# Patient Record
(demographics unavailable — no encounter records)

---

## 2025-05-19 NOTE — DISCUSSION/SUMMARY
[de-identified] : Discussed the nature of the diagnosis and risk and benefits of different modalities of treatment. RT middle TF RT dupytrens  LT forearm soft tissue mass Discussed no intervention without loss of extension for Dupuytren's  Pt was reassured   Discussed conservative management as symptoms demand vs CSI. Pt would like a CSI. Done today and tolerated well Should CSI fail to provide relief, pt will take Tylenol and apply warm compresses. Pt does not take NSAID's due to stomach irritation  All questions answered.

## 2025-05-19 NOTE — PHYSICAL EXAM
[Left] : left elbow [Right] : right hand [FreeTextEntry3] : Pre tendinous cords to 3,4, & 5. No loss of extension

## 2025-05-19 NOTE — HISTORY OF PRESENT ILLNESS
[Constant] : constant [Household chores] : household chores [Leisure] : leisure [Sleep] : sleep [Nothing helps with pain getting better] : Nothing helps with pain getting better [Bending forward] : bending forward [Retired] : Work status: retired [de-identified] : 77 year old male followed for RT middle triggering & pain, worse at night. Has had 1 CSI. He c/o sofft tissue mass in the LT forearm     RT middle TF CSI: 10/14/24 [] : no [FreeTextEntry1] : right hand and left forearm [FreeTextEntry5] : Pt was seen in [place] and was diagnosed with Dupuytren in the rt hand.  Pt has trigger finger, middle right.  Left forearm patient states he has a small nodule. Pt states wearing a hand brace in the evening on the right hand, with relief. Pain with trigger finger in the evening 7/10

## 2025-05-19 NOTE — HISTORY OF PRESENT ILLNESS
[Constant] : constant [Household chores] : household chores [Leisure] : leisure [Sleep] : sleep [Nothing helps with pain getting better] : Nothing helps with pain getting better [Bending forward] : bending forward [Retired] : Work status: retired [de-identified] : 77 year old male followed for RT middle triggering & pain, worse at night. Has had 1 CSI. He c/o sofft tissue mass in the LT forearm     RT middle TF CSI: 10/14/24 [] : no [FreeTextEntry1] : right hand and left forearm [FreeTextEntry5] : Pt was seen in [place] and was diagnosed with Dupuytren in the rt hand.  Pt has trigger finger, middle right.  Left forearm patient states he has a small nodule. Pt states wearing a hand brace in the evening on the right hand, with relief. Pain with trigger finger in the evening 7/10

## 2025-05-19 NOTE — DISCUSSION/SUMMARY
[de-identified] : Discussed the nature of the diagnosis and risk and benefits of different modalities of treatment. RT middle TF RT dupytrens  LT forearm soft tissue mass Discussed no intervention without loss of extension for Dupuytren's  Pt was reassured   Discussed conservative management as symptoms demand vs CSI. Pt would like a CSI. Done today and tolerated well Should CSI fail to provide relief, pt will take Tylenol and apply warm compresses. Pt does not take NSAID's due to stomach irritation  All questions answered.